# Patient Record
Sex: FEMALE | Race: AMERICAN INDIAN OR ALASKA NATIVE
[De-identification: names, ages, dates, MRNs, and addresses within clinical notes are randomized per-mention and may not be internally consistent; named-entity substitution may affect disease eponyms.]

---

## 2019-11-06 ENCOUNTER — HOSPITAL ENCOUNTER (OUTPATIENT)
Dept: HOSPITAL 5 - SPVWC | Age: 69
Discharge: HOME | End: 2019-11-06
Attending: FAMILY MEDICINE
Payer: MEDICARE

## 2019-11-06 DIAGNOSIS — N64.89: ICD-10-CM

## 2019-11-06 DIAGNOSIS — R92.2: Primary | ICD-10-CM

## 2019-11-06 PROCEDURE — 77066 DX MAMMO INCL CAD BI: CPT

## 2019-11-06 NOTE — ULTRASOUND REPORT
BILATERAL DIGITAL DIAGNOSTIC MAMMOGRAM WITH CAD  -- 11/6/2019

RIGHT LIMITED BREAST ULTRASOUND

 

INDICATION: A right palpable lump. History of bilateral benign biopsies area



TECHNIQUE:  Digital bilateral mammographic imaging was performed. Spot compression views were obtaine
d. Limited ultrasound was performed. This examination was interpreted with the benefit of Computer-
ded Detection (CAD) analysis. 



COMPARISON: None available. However, she indicated that she had a previous mammogram at [facility] 
in [place].



FINDINGS: 



Breast Density: The breasts are heterogeneously dense, which may obscure small masses.



MAMMOGRAPHIC FINDINGS: There is no evidence of dominant mass, suspicious calcifications or architectu
ral distortion in either breast. A left outer biopsy clip and a right upper outer biopsy clip. Left r
etroareolar postsurgical scar.

No mammographic finding at a right palpable marker at the cleavage.



ULTRASOUND FINDINGS: Targeted ultrasound evaluation was performed of the area of interest.   Ultrasou
nd of the inner right breast at the cleavage was performed where the patient feels a lump. She said t
hat she previously had a Castaneda catheter and that a lump in developed at the site of the catheter. U
ltrasound demonstrated an anechoic lesion of the skin measuring approximately 4 x 2 x 2 mm. No solid 
mass or shadowing.



IMPRESSION: A benign 4 mm skin lesion of the inner right breast where a lump is palpable. No suspicio
us finding in either breast.



Note: We will attempt to obtain a prior mammogram for comparison.



Follow up recommendation: Routine yearly



BI-RADS Category 2:  Benign.





A "normal" or negative report should not discourage follow up or biopsy of a clinically significant f
inding.



A written summary of these findings will be mailed to the patient. The patient will be entered into a
 mammography reporting system which will generate a reminder letter for the patient's next appointmen
t at the appropriate interval.



According to the American College [employer], yearly mammograms are recommended starting at age 40 
and continuing as long as a woman is in good health.  Breast MRI is recommended for women with an sirena
roximately 20-25% or greater lifetime risk of breast cancer, including women with a strong family his
tory of breast or ovarian cancer and women who have been treated for Hodgkin's disease.



Signer Name: Mikael Capone MD 

Signed: 11/6/2019 4:06 PM

 Workstation Name: [id number]

## 2020-12-29 ENCOUNTER — HOSPITAL ENCOUNTER (OUTPATIENT)
Dept: HOSPITAL 5 - SPVWC | Age: 70
Discharge: HOME | End: 2020-12-29
Attending: SURGERY
Payer: MEDICARE

## 2020-12-29 DIAGNOSIS — Z12.31: Primary | ICD-10-CM

## 2020-12-29 PROCEDURE — 77063 BREAST TOMOSYNTHESIS BI: CPT

## 2020-12-29 PROCEDURE — 77067 SCR MAMMO BI INCL CAD: CPT

## 2020-12-29 NOTE — MAMMOGRAPHY REPORT
DIGITAL SCREENING MAMMOGRAM WITH TOMOSYNTHESIS WITH CAD, 12/29/2020



CLINICAL INFORMATION / INDICATION: Routine Screening Mammography. 



TECHNIQUE:  Digital bilateral 2D and 3D mammography with tomosynthesis was obtained in the craniocaud
al and mediolateral oblique projections.  Computer-Aided Detection (CAD) analysis was used for interp
retation of this study.



COMPARISON: 11/06/19.



FINDINGS: 



Breast Density: The breasts are heterogeneously dense, which may obscure small masses.



No dominant mass or suspicious calcifications in either breast. 



Bilateral breast scarring is stable. Bilateral biopsy clips bilaterally are again noted. No new abnor
mality is seen.

 

IMPRESSION: No mammographic evidence of malignancy.



Follow up recommendation: Routine yearly



BI-RADS Category 2:  Benign.





-------------------------------------------------------------------------------------------

A "normal" or negative report should not discourage follow up or biopsy of a clinically significant f
inding.



A written summary of these findings will be mailed to the patient. The patient will be entered into a
 mammography reporting system which will generate a reminder letter for the patient's next appointmen
t at the appropriate interval.



The American College of [employer] recommends yearly mammograms starting at age 40 and continuing as l
brionna as a woman is in good health.  Breast MRI is recommended for women with an approximate 20-25% or 
greater lifetime risk of breast cancer, including women with a strong family history of breast or ova
nolan cancer or who have been treated for Hodgkin's disease.



Signer Name: Gato Willsi MD 

Signed: 12/29/2020 2:05 PM

Workstation Name: [id number]

## 2020-12-29 NOTE — MAMMOGRAPHY REPORT
DIGITAL SCREENING MAMMOGRAM WITH TOMOSYNTHESIS WITH CAD, 12/29/2020



CLINICAL INFORMATION / INDICATION: Routine Screening Mammography. 



TECHNIQUE:  Digital bilateral 2D and 3D mammography with tomosynthesis was obtained in the craniocaud
al and mediolateral oblique projections.  Computer-Aided Detection (CAD) analysis was used for interp
retation of this study.



COMPARISON: 11/06/19.



FINDINGS: 



Breast Density: The breasts are heterogeneously dense, which may obscure small masses.



No dominant mass or suspicious calcifications in either breast. 



Bilateral breast scarring is stable. Bilateral biopsy clips bilaterally are again noted. No new abnor
mality is seen.

 

IMPRESSION: No mammographic evidence of malignancy.



Follow up recommendation: Routine yearly



BI-RADS Category 2:  Benign.





-------------------------------------------------------------------------------------------

A "normal" or negative report should not discourage follow up or biopsy of a clinically significant f
inding.



A written summary of these findings will be mailed to the patient. The patient will be entered into a
 mammography reporting system which will generate a reminder letter for the patient's next appointmen
t at the appropriate interval.



The American [employer] of [employer] recommends yearly mammograms starting at age 40 and continuing as l
brionna as a woman is in good health.  Breast MRI is recommended for women with an approximate 20-25% or 
greater lifetime risk of breast cancer, including women with a strong family history of breast or ova
nolan cancer or who have been treated for Hodgkin's disease.



Signer Name: Gato Willis MD 

Signed: 12/29/2020 2:05 PM

Workstation Name: [id number]

## 2021-05-07 LAB
HCT VFR BLD CALC: 37.2 % (ref 30.3–42.9)
HGB BLD-MCNC: 12.7 GM/DL (ref 10.1–14.3)
MCHC RBC AUTO-ENTMCNC: 34 % (ref 30–34)
MCV RBC AUTO: 93 FL (ref 79–97)
PLATELET # BLD: 307 K/MM3 (ref 140–440)
RBC # BLD AUTO: 3.98 M/MM3 (ref 3.65–5.03)

## 2021-05-12 ENCOUNTER — HOSPITAL ENCOUNTER (OUTPATIENT)
Dept: HOSPITAL 5 - OR | Age: 71
Discharge: HOME | End: 2021-05-12
Attending: SURGERY
Payer: MEDICARE

## 2021-05-12 VITALS — DIASTOLIC BLOOD PRESSURE: 75 MMHG | SYSTOLIC BLOOD PRESSURE: 132 MMHG

## 2021-05-12 DIAGNOSIS — E78.00: ICD-10-CM

## 2021-05-12 DIAGNOSIS — M19.90: ICD-10-CM

## 2021-05-12 DIAGNOSIS — K21.9: ICD-10-CM

## 2021-05-12 DIAGNOSIS — I10: ICD-10-CM

## 2021-05-12 DIAGNOSIS — Z20.822: ICD-10-CM

## 2021-05-12 DIAGNOSIS — Z90.710: ICD-10-CM

## 2021-05-12 DIAGNOSIS — L72.0: ICD-10-CM

## 2021-05-12 DIAGNOSIS — Z86.2: ICD-10-CM

## 2021-05-12 DIAGNOSIS — E66.9: ICD-10-CM

## 2021-05-12 DIAGNOSIS — Z90.49: ICD-10-CM

## 2021-05-12 DIAGNOSIS — Z98.42: ICD-10-CM

## 2021-05-12 DIAGNOSIS — Z79.899: ICD-10-CM

## 2021-05-12 DIAGNOSIS — L72.3: Primary | ICD-10-CM

## 2021-05-12 DIAGNOSIS — Z98.41: ICD-10-CM

## 2021-05-12 PROCEDURE — 11403 EXC TR-EXT B9+MARG 2.1-3CM: CPT

## 2021-05-12 PROCEDURE — 85027 COMPLETE CBC AUTOMATED: CPT

## 2021-05-12 PROCEDURE — U0003 INFECTIOUS AGENT DETECTION BY NUCLEIC ACID (DNA OR RNA); SEVERE ACUTE RESPIRATORY SYNDROME CORONAVIRUS 2 (SARS-COV-2) (CORONAVIRUS DISEASE [COVID-19]), AMPLIFIED PROBE TECHNIQUE, MAKING USE OF HIGH THROUGHPUT TECHNOLOGIES AS DESCRIBED BY CMS-2020-01-R: HCPCS

## 2021-05-12 PROCEDURE — 88304 TISSUE EXAM BY PATHOLOGIST: CPT

## 2021-05-12 PROCEDURE — 36415 COLL VENOUS BLD VENIPUNCTURE: CPT

## 2021-05-12 NOTE — OPERATIVE REPORT
Operative Report


Operative Report: 





Operative Report: 





May 12, 2021





Preoperative diagnosis: Right breast sebaceous cyst of the upper inner quadrant





Postoperative diagnosis: Same





Procedure: Right breast sebaceous excisional biopsy of upper inner quadrant 





Surgeon: Laura Mckay MD





Assistant: MACK Ramirez MD





Anesthesia: General





Findings: Right breast sebaeous cyst at the 3:00 position 14 cm FN





Complications: None





EBL: Less then 25 cc, minimal





Disposition: PACU in good condition





Indications for operative procedure: This is a 71 year-old lady with known right

breast sebaceous cyst at the 3:00 position close to the sternum. She wished to 

proceed with excision given discomfort and recent increase in size. Recent 

screening mammogram with no suspicious findings. She wished to proceed with the 

above procedure.





Procedure in detail: Patient was taken to the operating room and was laid 

supine.  Gen. anesthesia was administered.  Right breast and axilla were prepped

and draped in the normal sterile operative fashion. Timeout was performed. 

Ultrasound was used as well identification of known breast sebaceous cyst at the


3:00 position 14 cm FN.





Attention was then taken towards the right breast. Ultrasound was used as well. 

A breast incision was made from the 3:00 position 14 cm FN with a 15 blade knife

and dissection taken down to subcutaneous tissues.  The capsule of the sebaceous

cyst was excised in its entirety using the Bovie cautery and 15 blade knife. The

surrounding tissues were excised as well. Hemostasis was obtained with the Bovie

cautery. Specimen was sent to pathology. Ultrasound used and no other suspicious

lesions present. The breast cavity was appropriately irrigated and suctioned. 

Breast cavity was anesthesized with 1% lidocaine mixed with quarter percent 

marcaine. Deep breast tissue were approximated and closed using interrupted 3-0 

Vicryl and the subcutaneous tissues approximated and closed using interrupted 3-

0 Vicryl followed by closing of the skin with a running 4-0 Monocryl and 

dermabond. The patient tolerated surgery very well and she was awaken from 

anesthesia without any complication and transported to PACU in good condition.

## 2021-05-12 NOTE — ANESTHESIA CONSULTATION
Anesthesia Consult and Med Hx


Date of service: 05/12/21





- Airway


Anesthetic Teeth Evaluation: Good


ROM Head & Neck: Adequate


Mental/Hyoid Distance: Adequate


Mallampati Class: Class II


Intubation Access Assessment: Probably Good





- Pre-Operative Health Status


ASA Pre-Surgery Classification: ASA2


Proposed Anesthetic Plan: General





- Pulmonary


Hx Smoking: No


Hx Respiratory Symptoms: No





- Cardiovascular System


Hx Hypertension: Yes (took antihypertensive this morning)


Hx Heart Attack/AMI: No





- Central Nervous System


CVA: No


Hx Back Pain: Yes





- Endocrine


Hx Renal Disease: No


Hx Liver Disease: No


Hx Insulin Dependent Diabetes: No


Hx Non-Insulin Dependent Diabetes: No


Hx Thyroid Disease: No





- Other Systems


Hx Obesity: Yes (BMI)





- Additional Comments


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 Has had 

surgeries since without anesthetic complications.

## 2021-05-12 NOTE — SHORT STAY SUMMARY
Short Stay Documentation


Date of service: 05/12/21





- History


H&P: obtained from office





- Allergies and Medications


Current Medications: 


                                    Allergies





No Known Allergies Allergy (Verified 05/04/21 18:01)


   





                                Home Medications











 Medication  Instructions  Recorded  Confirmed  Last Taken  Type


 


amLODIPine [Norvasc] 10 mg PO DAILY 05/07/21 05/07/21 Unknown History


 


Ibuprofen [Motrin 800 MG tab] 800 mg PO Q8HR PRN #8 tablet 05/12/21  Unknown Rx








Active Medications





Hydrocodone Bitart/Acetaminophen (Hydrocodone/Acetaminophen 5-325 Mg Tab)  2 

each PO ONCE PRN


   PRN Reason: Pain, Moderate (4-6)


   Stop: 05/12/21 13:00


Cefazolin Sodium (Cefazolin/Sterile Water 2 Gm/20 Ml Syringe)  2 gm IV PREOP NR


   Stop: 05/12/21 23:59


Fentanyl (Fentanyl 100 Mcg/2 Ml Inj)  50 mcg IV Q5MIN PRN


   PRN Reason: Pain , Severe (7-10)


   Stop: 05/12/21 23:00


Lactated Ringer's (Lactated Ringers)  1,000 mls @ 100 mls/hr IV AS DIRECT YOSELIN


   Stop: 05/12/21 23:59


Ondansetron HCl (Ondansetron 4 Mg/2 Ml Inj)  4 mg IV ONCE PRN


   PRN Reason: Nausea And Vomiting


   Stop: 05/12/21 13:00











- Brief post op/procedure progress note


Date of procedure: 05/12/21


Pre-op diagnosis: Right breast sebaceous cyst upper inner quadrant


Post-op diagnosis: same


Procedure: 





Right breast sebaceous cyst excisional biopsy of the upper inner quadrant


Anesthesia: GETA


Findings: 





Right sebaceous cyst excision at 3:00 position close to sternum


Surgeon: JONATHON ARGUELLES


Estimated blood loss: minimal


Pathology: list


Specimen disposition: to lab


Condition: stable





- Disposition


Condition at discharge: Good


Disposition: DC-01 TO HOME OR SELFCARE





Short Stay Discharge Plan


Activity: other (no heavy lifting)


Diet: regular


Wound: keep clean and dry (may shower in 48 hours; apply bacitracin twice daily;

no baths; wear supportive bra)


Follow up with: 


JONATHON ARGUELLES MD [Staff Physician] - 7 Days


Prescriptions: 


Ibuprofen [Motrin 800 MG tab] 800 mg PO Q8HR PRN #8 tablet


 PRN Reason: Pain , Severe (7-10)

## 2024-05-03 NOTE — MAMMOGRAPHY REPORT
BILATERAL DIGITAL DIAGNOSTIC MAMMOGRAM WITH CAD  -- 11/6/2019

RIGHT LIMITED BREAST ULTRASOUND

 

INDICATION: A right palpable lump. History of bilateral benign biopsies area



TECHNIQUE:  Digital bilateral mammographic imaging was performed. Spot compression views were obtaine
d. Limited ultrasound was performed. This examination was interpreted with the benefit of Computer-
ded Detection (CAD) analysis. 



COMPARISON: None available. However, she indicated that she had a previous mammogram at [facility] 
in [place].



FINDINGS: 



Breast Density: The breasts are heterogeneously dense, which may obscure small masses.



MAMMOGRAPHIC FINDINGS: There is no evidence of dominant mass, suspicious calcifications or architectu
ral distortion in either breast. A left outer biopsy clip and a right upper outer biopsy clip. Left r
etroareolar postsurgical scar.

No mammographic finding at a right palpable marker at the cleavage.



ULTRASOUND FINDINGS: Targeted ultrasound evaluation was performed of the area of interest.   Ultrasou
nd of the inner right breast at the cleavage was performed where the patient feels a lump. She said t
hat she previously had a Castaneda catheter and that a lump in developed at the site of the catheter. U
ltrasound demonstrated an anechoic lesion of the skin measuring approximately 4 x 2 x 2 mm. No solid 
mass or shadowing.



IMPRESSION: A benign 4 mm skin lesion of the inner right breast where a lump is palpable. No suspicio
us finding in either breast.



Note: We will attempt to obtain a prior mammogram for comparison.



Follow up recommendation: Routine yearly



BI-RADS Category 2:  Benign.





A "normal" or negative report should not discourage follow up or biopsy of a clinically significant f
inding.



A written summary of these findings will be mailed to the patient. The patient will be entered into a
 mammography reporting system which will generate a reminder letter for the patient's next appointmen
t at the appropriate interval.



According to the American [employer] of [employer], yearly mammograms are recommended starting at age 40 
and continuing as long as a woman is in good health.  Breast MRI is recommended for women with an sirena
roximately 20-25% or greater lifetime risk of breast cancer, including women with a strong family his
tory of breast or ovarian cancer and women who have been treated for Hodgkin's disease.



Signer Name: Mikael Capone MD 

Signed: 11/6/2019 4:06 PM

 Workstation Name: [id number] [employer]